# Patient Record
Sex: MALE | Race: WHITE | NOT HISPANIC OR LATINO | Employment: FULL TIME | URBAN - METROPOLITAN AREA
[De-identification: names, ages, dates, MRNs, and addresses within clinical notes are randomized per-mention and may not be internally consistent; named-entity substitution may affect disease eponyms.]

---

## 2017-02-01 ENCOUNTER — TELEPHONE (OUTPATIENT)
Dept: ENDOCRINOLOGY | Facility: CLINIC | Age: 52
End: 2017-02-01

## 2017-02-01 NOTE — TELEPHONE ENCOUNTER
----- Message from Jewels Cavanaugh LPN sent at 2/1/2017  2:42 PM CST -----  Contact:  call //[phone]// wife// abeba   Pt insurance will not pay for name brand synthroid.Pt needs a letter that states he needs name brand only. Please advise   ----- Message -----     From: Vera Mir     Sent: 2/1/2017   1:41 PM       To: George Celaya Staff      Placed a call  To  The pod //   Pt  Needs a  Letter for  Crestor 5  Mg  ,   Because ins //   Will  Be out    By   Next  Week // please call

## 2017-02-03 ENCOUNTER — TELEPHONE (OUTPATIENT)
Dept: ENDOCRINOLOGY | Facility: CLINIC | Age: 52
End: 2017-02-03

## 2017-02-22 ENCOUNTER — OFFICE VISIT (OUTPATIENT)
Dept: ENDOCRINOLOGY | Facility: CLINIC | Age: 52
End: 2017-02-22
Payer: COMMERCIAL

## 2017-02-22 VITALS
SYSTOLIC BLOOD PRESSURE: 118 MMHG | BODY MASS INDEX: 36.43 KG/M2 | HEIGHT: 72 IN | HEART RATE: 82 BPM | WEIGHT: 268.94 LBS | DIASTOLIC BLOOD PRESSURE: 78 MMHG

## 2017-02-22 DIAGNOSIS — E78.5 HYPERLIPIDEMIA, UNSPECIFIED HYPERLIPIDEMIA TYPE: ICD-10-CM

## 2017-02-22 DIAGNOSIS — E04.2 MULTINODULAR GOITER: Primary | ICD-10-CM

## 2017-02-22 DIAGNOSIS — E89.0 POSTOPERATIVE HYPOTHYROIDISM: ICD-10-CM

## 2017-02-22 PROCEDURE — 99214 OFFICE O/P EST MOD 30 MIN: CPT | Mod: S$GLB,,, | Performed by: INTERNAL MEDICINE

## 2017-02-22 PROCEDURE — 1160F RVW MEDS BY RX/DR IN RCRD: CPT | Mod: S$GLB,,, | Performed by: INTERNAL MEDICINE

## 2017-02-22 PROCEDURE — 99999 PR PBB SHADOW E&M-EST. PATIENT-LVL III: CPT | Mod: PBBFAC,,, | Performed by: INTERNAL MEDICINE

## 2017-02-22 RX ORDER — NAFTIFINE HYDROCHLORIDE 20 MG/G
CREAM TOPICAL
Refills: 2 | COMMUNITY
Start: 2017-01-16 | End: 2019-03-15

## 2017-02-22 NOTE — MR AVS SNAPSHOT
[facility]  [address] [facility] [address]  [place] [address]  Phone: [phone]  Fax: [phone]                  Williams Kumari   2017 3:30 PM   Office Visit    Description:  Male : 1965   Provider:  Yomi Vazquez MD   Department:  [place] [facility]           Reason for Visit     Hypothyroidism           Diagnoses this Visit        Comments    Multinodular goiter    -  Primary     Postoperative hypothyroidism         Hyperlipidemia, unspecified hyperlipidemia type                To Do List           Goals (5 Years of Data)     None      [facility]     [facility] Care [facility] -  Assistance  Registered nurses in the [facility] provide clinical advisement, health education, appointment booking, and other advisory services.  Call for this free service at [phone].             Medications           Message regarding Medications     Verify the changes and/or additions to your medication regime listed below are the same as discussed with your clinician today.  If any of these changes or additions are incorrect, please notify your healthcare provider.        STOP taking these medications     pantoprazole (PROTONIX) 40 MG tablet Take 40 mg by mouth once daily.           Verify that the below list of medications is an accurate representation of the medications you are currently taking.  If none reported, the list may be blank. If incorrect, please contact your healthcare provider. Carry this list with you in case of emergency.           Current Medications     aspirin (ECOTRIN) 81 MG EC tablet Take 81 mg by mouth once daily.    CRESTOR 5 mg tablet Take 5 mg by mouth every evening.     levothyroxine (SYNTHROID) 25 MCG tablet Take 1 tablet (25 mcg total) by mouth once daily.    naftifine 2 % Crea NEIL AA BID           Clinical Reference Information           Your Vitals Were     BP Pulse Height Weight BMI    118/78 (BP Method: Manual) 82 6' (1.829  m) 122 kg (268 lb 15.4 oz) 36.48 kg/m2      Blood Pressure          Most Recent Value    BP  118/78      Allergies as of 2/22/2017     No Known Allergies      Immunizations Administered on Date of Encounter - 2/22/2017     None      Orders Placed During Today's Visit     Future Labs/Procedures Expected by Expires    TSH  2/22/2017 4/23/2018    US Soft Tissue Head Neck Thyroid  2/22/2017 2/22/2018      Language Assistance Services     ATTENTION: Language assistance services are available, free of charge. Please call [phone].      ATENCIÓN: Si habla español, tiene a campbell disposición servicios gratuitos de asistencia lingüística. Llame al [phone].     CHÚ Ý: N?u b?n nói Ti?ng Vi?t, có các d?ch v? h? tr? ngôn ng? mi?n phí dành cho b?n. G?i s? [phone].         [facility] complies with applicable Federal civil rights laws and does not discriminate on the basis of race, color, national origin, age, disability, or sex.

## 2017-02-22 NOTE — PROGRESS NOTES
Subjective:      Patient ID: Carmela Duff is a 51 y.o. male.    Chief Complaint:  Hypothyroidism      History of Present Illness   [facility] he had right hemithyroidectomy due to MNG and no thyroid cancer per Path report.   No FH of thyroid cancer except second cousin    Comes for f/u today       Denies weight gain  denies temperature changes      Denies dysphagia/dyspnea/dysphonia               Review of Systems   Constitutional: Positive for fatigue and unexpected weight change.   Eyes: Negative for visual disturbance.   Respiratory: Negative for shortness of breath.    Cardiovascular: Negative for chest pain.   Gastrointestinal: Negative for abdominal pain, constipation and diarrhea.   Musculoskeletal: Negative for myalgias.   Skin: Negative for wound.   Neurological: Negative for headaches.   Hematological: Does not bruise/bleed easily.   Psychiatric/Behavioral: Negative for sleep disturbance.       Objective:   Physical Exam   Constitutional: No distress.   Neck: No tracheal deviation present. No thyromegaly present.   Skin: He is not diaphoretic.   Vitals reviewed.      Lab Review:     Results for CARMELA DUFF (MRN [id number]) as of 3/16/2016 13:21   Ref. Range 12/10/2015 07:52   Sodium Latest Ref Range: 136-145 mmol/L 141   Potassium Latest Ref Range: 3.5-5.1 mmol/L 4.4   Chloride Latest Ref Range:  mmol/L 103   CO2 Latest Ref Range: 23-29 mmol/L 28   Anion Gap Latest Ref Range: 8-16 mmol/L 10   BUN, Bld Latest Ref Range: 9-21 mg/dL 13   Creatinine Latest Ref Range: 0.50-1.40 mg/dL 0.92   eGFR if non African American Latest Ref Range: >60 mL/min/1.73 m^2 >60   eGFR if  Latest Ref Range: >60 mL/min/1.73 m^2 >60   Glucose Latest Ref Range:  mg/dL 92   Calcium Latest Ref Range: 8.7-10.5 mg/dL 9.0   Alkaline Phosphatase Latest Ref Range:  U/L 75   Total Protein Latest Ref Range: 6.0-8.4 g/dL 6.1   Albumin Latest Ref Range: 3.5-5.2 g/dL 3.8   Total Bilirubin  Latest Ref Range: 0.1-1.0 mg/dL 0.4   AST ([place] Latest Ref Range: 17-59 U/L 25   ALT Latest Ref Range: 10-44 U/L 41   Triglycerides Latest Ref Range:  mg/dL 58   Cholesterol Latest Ref Range: 120-199 mg/dL 148   HDL Latest Ref Range: 40-75 mg/dL 39 (L)   LDL Cholesterol Latest Ref Range: 63.0-159.0 mg/dL 97.4   Total Cholesterol/HDL Ratio Latest Ref Range: 2.0-5.0  3.8   TSH Latest Ref Range: 0.400-4.000 uIU/mL 2.840   Free T4 Latest Ref Range: 0.78-2.19 ng/dL 0.69 (L)   Results for CARMELA DUFF (MRN [id number]) as of 2/22/2017 16:19   Ref. Range 12/30/2016 07:21   Cholesterol Latest Ref Range: 120 - 199 mg/dL 144   HDL Latest Ref Range: 40 - 75 mg/dL 35 (L)   LDL Cholesterol Latest Ref Range: 63.0 - 159.0 mg/dL 90.0   Total Cholesterol/HDL Ratio Latest Ref Range: 2.0 - 5.0  4.1   Triglycerides Latest Ref Range: 30 - 150 mg/dL 95   TSH Latest Ref Range: 0.400 - 4.000 uIU/mL 2.380     Assessment:     Plan:     # hypothyroidism s/p jada thyroidectomy     - continue SYnthroid 25 mcg daily   Can not take generic       # MNG   - repeat US in 1 year    # hyperlipidemia with FH of early CAD  Follows with PCP    Follow up:  Blood work before next visit   - repeat US in 1 year  - RTC with TSH in 12 months.

## 2017-03-20 RX ORDER — LEVOTHYROXINE SODIUM 25 UG/1
TABLET ORAL
Qty: 30 TABLET | Refills: 0 | Status: SHIPPED | OUTPATIENT
Start: 2017-03-20 | End: 2017-03-21 | Stop reason: SDUPTHER

## 2017-03-21 RX ORDER — LEVOTHYROXINE SODIUM 25 UG/1
TABLET ORAL
Qty: 30 TABLET | Refills: 6 | Status: SHIPPED | OUTPATIENT
Start: 2017-03-21 | End: 2017-11-16 | Stop reason: SDUPTHER

## 2017-03-21 NOTE — TELEPHONE ENCOUNTER
----- Message from Anne Billingsley sent at 3/21/2017  8:44 AM CDT -----  Contact: [place]  Requesting a call back in regards to Rx refills for SYNTHROID 25 mcg tablet.    Stated that the patient requested BRAND only with refills.    Please send Rx [facility] [phone] (Phone)  [phone] (Fax)    Please call [place] at [phone].

## 2017-11-08 ENCOUNTER — TELEPHONE (OUTPATIENT)
Dept: ENDOCRINOLOGY | Facility: CLINIC | Age: 52
End: 2017-11-08

## 2017-11-08 DIAGNOSIS — E03.9 HYPOTHYROIDISM, UNSPECIFIED TYPE: Primary | ICD-10-CM

## 2017-11-08 NOTE — TELEPHONE ENCOUNTER
----- Message from Lima Petersen sent at 11/8/2017  9:49 AM CST -----  Wife/[place]ali is calling concerning patient having hot flashes and dizzy. They do not know what is going on. Wife called on 11/6/17 and still has not received a reply. They are worried about patient's symptoms. Please call back at [phone] to advise.

## 2017-11-08 NOTE — TELEPHONE ENCOUNTER
I spoke to the patient and he would like to have labs drawn now. He is not scheduled to see you until March 2018 with a TSH and an US. Do you want to check any other labs besides TFT's, ?? Testosterone, CBC

## 2017-11-08 NOTE — TELEPHONE ENCOUNTER
The patient will have a TSH drawn tomorrow, and consult his PCP for the hot flashes and dizziness.

## 2017-11-17 RX ORDER — LEVOTHYROXINE SODIUM 25 UG/1
TABLET ORAL
Qty: 30 TABLET | Refills: 6 | Status: SHIPPED | OUTPATIENT
Start: 2017-11-17 | End: 2018-06-13 | Stop reason: SDUPTHER

## 2018-01-22 PROBLEM — E04.1 LEFT THYROID NODULE: Status: ACTIVE | Noted: 2018-01-22

## 2018-03-05 ENCOUNTER — TELEPHONE (OUTPATIENT)
Dept: ENDOCRINOLOGY | Facility: CLINIC | Age: 53
End: 2018-03-05

## 2018-03-05 NOTE — TELEPHONE ENCOUNTER
Dr. Vazquez, pt's wife is calling to see if US needs to be repeated prior to appt.  PCP ordered US in Dec 2017, pt then had FNA by ENT (unsatisfactory).  Should he keep US appt for next week?

## 2018-03-15 ENCOUNTER — LAB VISIT (OUTPATIENT)
Dept: LAB | Facility: HOSPITAL | Age: 53
End: 2018-03-15
Attending: INTERNAL MEDICINE
Payer: COMMERCIAL

## 2018-03-15 DIAGNOSIS — E89.0 POSTOPERATIVE HYPOTHYROIDISM: ICD-10-CM

## 2018-03-15 DIAGNOSIS — E04.2 MULTINODULAR GOITER: ICD-10-CM

## 2018-03-15 LAB — TSH SERPL DL<=0.005 MIU/L-ACNC: 2.54 UIU/ML

## 2018-03-15 PROCEDURE — 84443 ASSAY THYROID STIM HORMONE: CPT

## 2018-03-15 PROCEDURE — 36415 COLL VENOUS BLD VENIPUNCTURE: CPT | Mod: PO

## 2018-03-19 ENCOUNTER — OFFICE VISIT (OUTPATIENT)
Dept: ENDOCRINOLOGY | Facility: CLINIC | Age: 53
End: 2018-03-19
Payer: COMMERCIAL

## 2018-03-19 VITALS
DIASTOLIC BLOOD PRESSURE: 86 MMHG | BODY MASS INDEX: 35.89 KG/M2 | WEIGHT: 265 LBS | SYSTOLIC BLOOD PRESSURE: 120 MMHG | HEART RATE: 54 BPM | HEIGHT: 72 IN

## 2018-03-19 DIAGNOSIS — E89.0 POSTOPERATIVE HYPOTHYROIDISM: Primary | ICD-10-CM

## 2018-03-19 DIAGNOSIS — E04.1 LEFT THYROID NODULE: ICD-10-CM

## 2018-03-19 PROCEDURE — 99214 OFFICE O/P EST MOD 30 MIN: CPT | Mod: S$GLB,,, | Performed by: INTERNAL MEDICINE

## 2018-03-19 PROCEDURE — 99999 PR PBB SHADOW E&M-EST. PATIENT-LVL III: CPT | Mod: PBBFAC,,, | Performed by: INTERNAL MEDICINE

## 2018-03-19 NOTE — PROGRESS NOTES
Subjective:      Patient ID: Carmela Duff is a 52 y.o. male.    Chief Complaint:  Hypothyroidism      History of Present Illness   [facility] due to MNG and no thyroid cancer per Path report.   No FH of thyroid cancer except second cousin    Comes for f/u today       Denies weight gain  denies temperature changes      Denies dysphagia/dyspnea/dysphonia               Review of Systems   Constitutional: Positive for fatigue and unexpected weight change.   Eyes: Negative for visual disturbance.   Respiratory: Negative for shortness of breath.    Cardiovascular: Negative for chest pain.   Gastrointestinal: Negative for abdominal pain, constipation and diarrhea.   Musculoskeletal: Negative for myalgias.   Skin: Negative for wound.   Neurological: Negative for headaches.   Hematological: Does not bruise/bleed easily.   Psychiatric/Behavioral: Negative for sleep disturbance.       Objective:   Physical Exam   Constitutional: No distress.   Neck: No tracheal deviation present. No thyromegaly present.   Skin: He is not diaphoretic.   Vitals reviewed.      Lab Review:     Results for CARMELA DUFF (MRN [id number]) as of 3/16/2016 13:21   Ref. Range 12/10/2015 07:52   Sodium Latest Ref Range: 136-145 mmol/L 141   Potassium Latest Ref Range: 3.5-5.1 mmol/L 4.4   Chloride Latest Ref Range:  mmol/L 103   CO2 Latest Ref Range: 23-29 mmol/L 28   Anion Gap Latest Ref Range: 8-16 mmol/L 10   BUN, Bld Latest Ref Range: 9-21 mg/dL 13   Creatinine Latest Ref Range: 0.50-1.40 mg/dL 0.92   eGFR if non African American Latest Ref Range: >60 mL/min/1.73 m^2 >60   eGFR if  Latest Ref Range: >60 mL/min/1.73 m^2 >60   Glucose Latest Ref Range:  mg/dL 92   Calcium Latest Ref Range: 8.7-10.5 mg/dL 9.0   Alkaline Phosphatase Latest Ref Range:  U/L 75   Total Protein Latest Ref Range: 6.0-8.4 g/dL 6.1   Albumin Latest Ref Range: 3.5-5.2 g/dL 3.8   Total Bilirubin  Latest Ref Range: 0.1-1.0 mg/dL 0.4   AST ([place]) Latest Ref Range: 17-59 U/L 25   ALT Latest Ref Range: 10-44 U/L 41   Triglycerides Latest Ref Range:  mg/dL 58   Cholesterol Latest Ref Range: 120-199 mg/dL 148   HDL Latest Ref Range: 40-75 mg/dL 39 (L)   LDL Cholesterol Latest Ref Range: 63.0-159.0 mg/dL 97.4   Total Cholesterol/HDL Ratio Latest Ref Range: 2.0-5.0  3.8   TSH Latest Ref Range: 0.400-4.000 uIU/mL 2.840   Free T4 Latest Ref Range: 0.78-2.19 ng/dL 0.69 (L)   Results for CARMELA DUFF (MRN [id number]) as of 2/22/2017 16:19   Ref. Range 12/30/2016 07:21   Cholesterol Latest Ref Range: 120 - 199 mg/dL 144   HDL Latest Ref Range: 40 - 75 mg/dL 35 (L)   LDL Cholesterol Latest Ref Range: 63.0 - 159.0 mg/dL 90.0   Total Cholesterol/HDL Ratio Latest Ref Range: 2.0 - 5.0  4.1   Triglycerides Latest Ref Range: 30 - 150 mg/dL 95   TSH Latest Ref Range: 0.400 - 4.000 uIU/mL 2.380     Assessment:     Plan:     # hypothyroidism s/p jada thyroidectomy     - continue SYnthroid 25 mcg daily   Can not take generic       # MNG   - repeat US in 1 year    # hyperlipidemia with FH of early CAD  Follows with PCP    Follow up:  TSH and US thyroid in 6 months   RTC in 6 months

## 2018-06-14 RX ORDER — LEVOTHYROXINE SODIUM 25 UG/1
TABLET ORAL
Qty: 30 TABLET | Refills: 6 | Status: SHIPPED | OUTPATIENT
Start: 2018-06-14 | End: 2018-12-15 | Stop reason: SDUPTHER

## 2018-09-17 ENCOUNTER — HOSPITAL ENCOUNTER (OUTPATIENT)
Dept: RADIOLOGY | Facility: HOSPITAL | Age: 53
Discharge: HOME | End: 2018-09-17
Attending: INTERNAL MEDICINE
Payer: COMMERCIAL

## 2018-09-17 DIAGNOSIS — E04.1 LEFT THYROID NODULE: ICD-10-CM

## 2018-09-17 DIAGNOSIS — E89.0 POSTOPERATIVE HYPOTHYROIDISM: ICD-10-CM

## 2018-09-17 PROCEDURE — 76536 US EXAM OF HEAD AND NECK: CPT | Mod: TC,PO

## 2018-09-17 PROCEDURE — 76536 US EXAM OF HEAD AND NECK: CPT | Mod: 26,,, | Performed by: RADIOLOGY

## 2018-09-24 ENCOUNTER — OFFICE VISIT (OUTPATIENT)
Dept: ENDOCRINOLOGY | Facility: CLINIC | Age: 53
End: 2018-09-24
Payer: COMMERCIAL

## 2018-09-24 VITALS
SYSTOLIC BLOOD PRESSURE: 132 MMHG | WEIGHT: 272.38 LBS | BODY MASS INDEX: 36.89 KG/M2 | HEART RATE: 48 BPM | HEIGHT: 72 IN | DIASTOLIC BLOOD PRESSURE: 82 MMHG

## 2018-09-24 DIAGNOSIS — E03.9 HYPOTHYROIDISM, UNSPECIFIED TYPE: Primary | ICD-10-CM

## 2018-09-24 DIAGNOSIS — E04.2 MULTIPLE THYROID NODULES: ICD-10-CM

## 2018-09-24 PROCEDURE — 3008F BODY MASS INDEX DOCD: CPT | Mod: CPTII,S$GLB,, | Performed by: INTERNAL MEDICINE

## 2018-09-24 PROCEDURE — 99999 PR PBB SHADOW E&M-EST. PATIENT-LVL III: CPT | Mod: PBBFAC,,, | Performed by: INTERNAL MEDICINE

## 2018-09-24 PROCEDURE — 99214 OFFICE O/P EST MOD 30 MIN: CPT | Mod: S$GLB,,, | Performed by: INTERNAL MEDICINE

## 2018-09-24 NOTE — PROGRESS NOTES
CHIEF COMPLAINT: Hypothyroidism  52 year old being seen as a new patient to me. Was seeing Dr. Vazquez. 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 Had a left unsatisfactory FNA 1/22/18. Synthroid 25 mcg. Started prior to thyroid surgery. No palpitations. No tremors. No difficulty swallowing.       PAST MEDICAL HISTORY/PAST SURGICAL HISTORY:  Reviewed in [facility]    SOCIAL HISTORY: + chewing tobacco. No alcohol.    FAMILY HISTORY:  2nd cousin with thyroid cancer. No DM    MEDICATIONS/ALLERGIES: The patient's MedCard has been updated and reviewed.      ROS:   Constitutional: No recent significant weight change  Eyes: No recent visual changes  ENT: No dysphagia  Cardiovascular: Denies current anginal symptoms  Respiratory: Denies current respiratory difficulty  Gastrointestinal: Denies recent bowel disturbances  GenitoUrinary - No dysuria  Skin: No new skin rash  Neurologic: No focal neurologic complaints  Remainder ROS negative        PE:    GENERAL: Well developed, well nourished.  PSYCH:  appropriate mood and affect  EYES:  PERRL, EOM intact.  ENT: Nares patent, oropharynx clear, mucosa pink,   NECK: Supple, trachea midline, No palpable thyroid nodule.   CHEST: Resp even and unlabored, CTA bilateral.  CARDIAC: RRR, S1, S2 heard, no murmurs, rubs, S3, or S4    LABS   Results for CARMELA DUFF (MRN [id number]) as of 9/24/2018 08:39   Ref. Range 9/17/2018 07:34   TSH Latest Ref Range: 0.400 - 4.000 uIU/mL 2.342     US SOFT TISSUE HEAD NECK THYROID    CLINICAL HISTORY:  Postprocedural hypothyroidism    TECHNIQUE:  Ultrasound of the thyroid and cervical lymph nodes was performed.    COMPARISON:  Thyroid ultrasound dated 12/06/2017    FINDINGS:  The right lobe of the thyroid gland is surgically absent.  The left lobe measures 4.0 x 1.5 x 1.6 cm with an estimated volume of 5.0 mL.  The thyroid isthmus measures 3 mm in thickness.    The patient underwent ultrasound-guided biopsy of a left thyroid nodule on  01/22/2018.    There is a 7 x 6 x 5 mm slightly heterogeneous nodule in the lower pole of the left lobe.  Previously, a 9 x 12 x 7 mm nodule was present at this site and was biopsied.  There is no new nodule.  There is no detrimental change in the appearance of the left lobe.    There is a normal right submandibular node measuring 1.4 x 0.7 x 0.5 cm.  In the left neck, there are sonographically normal level 2 and level 6 lymph nodes.      Impression       1. There is a slightly heterogeneous solid nodule in the lower pole of the left lobe which is slightly smaller compared to prior ultrasound dated 12/06/2017.  There is no detrimental change in the appearance of the thyroid gland compared to the prior study.  The right lobe is surgically absent.  2. There are normal bilateral neck lymph nodes.         ASSESSMENT/PLAN:  1. Thyroid Nodules- Has had a right lobectomy. Had an unsatisfactory FNA on the left 1/2018. US shows some slight reduction in nodule sie. At this point can repeat in a year    2. Hypothyroidism- TFT WNL. Symptomatically doing well.       FOLLOWUP  F/U 1 year TSH, Thyroid US

## 2018-12-16 RX ORDER — LEVOTHYROXINE SODIUM 25 UG/1
TABLET ORAL
Qty: 30 TABLET | Refills: 3 | Status: SHIPPED | OUTPATIENT
Start: 2018-12-16 | End: 2019-05-13 | Stop reason: SDUPTHER

## 2018-12-31 ENCOUNTER — TELEPHONE (OUTPATIENT)
Dept: INFECTIOUS DISEASES | Facility: CLINIC | Age: 53
End: 2018-12-31

## 2018-12-31 NOTE — TELEPHONE ENCOUNTER
----- Message from Gia Pat sent at 12/31/2018  8:45 AM CST -----  Contact: wife [place]   Wife needs to make a new patient appt foe patient today     Please call back [phone]

## 2019-05-13 RX ORDER — LEVOTHYROXINE SODIUM 25 UG/1
TABLET ORAL
Qty: 30 TABLET | Refills: 3 | Status: SHIPPED | OUTPATIENT
Start: 2019-05-13 | End: 2019-09-16 | Stop reason: SDUPTHER

## 2019-06-14 NOTE — TELEPHONE ENCOUNTER
Agree.   
Dr. Vazquez, I spoke w/ pt, states the generic did not work in the past, therefore, previous doctor has kept him on brand name. I spoke w/ the insurance and if we can fax a note Monday stating he tried and failed generic, they should cover it.   
Dr. Vazquez, the pt was only seen by you once and I do not see any documentation as to why he needs brand name.  Please advise before I call the insurance.    
Sure. Remind me and we will do that  
well developed

## 2019-09-16 DIAGNOSIS — E03.9 HYPOTHYROIDISM, UNSPECIFIED TYPE: Primary | ICD-10-CM

## 2019-09-17 RX ORDER — LEVOTHYROXINE SODIUM 25 UG/1
TABLET ORAL
Qty: 30 TABLET | Refills: 1 | Status: SHIPPED | OUTPATIENT
Start: 2019-09-17 | End: 2019-10-18 | Stop reason: SDUPTHER

## 2019-09-24 ENCOUNTER — HOSPITAL ENCOUNTER (OUTPATIENT)
Dept: RADIOLOGY | Facility: HOSPITAL | Age: 54
Discharge: HOME | End: 2019-09-24
Attending: INTERNAL MEDICINE
Payer: COMMERCIAL

## 2019-09-24 DIAGNOSIS — E03.9 HYPOTHYROIDISM, UNSPECIFIED TYPE: ICD-10-CM

## 2019-09-24 DIAGNOSIS — E04.2 MULTIPLE THYROID NODULES: ICD-10-CM

## 2019-09-24 PROCEDURE — 76536 US EXAM OF HEAD AND NECK: CPT | Mod: TC,PO

## 2019-09-24 PROCEDURE — 76536 US EXAM OF HEAD AND NECK: CPT | Mod: 26,,, | Performed by: RADIOLOGY

## 2019-09-24 PROCEDURE — 76536 US SOFT TISSUE HEAD NECK THYROID: ICD-10-PCS | Mod: 26,,, | Performed by: RADIOLOGY

## 2019-10-18 DIAGNOSIS — E03.9 HYPOTHYROIDISM, UNSPECIFIED TYPE: ICD-10-CM

## 2019-10-18 RX ORDER — LEVOTHYROXINE SODIUM 25 UG/1
TABLET ORAL
Qty: 30 TABLET | Refills: 1 | Status: SHIPPED | OUTPATIENT
Start: 2019-10-18 | End: 2019-12-18 | Stop reason: SDUPTHER

## 2019-10-18 RX ORDER — LEVOTHYROXINE SODIUM 25 UG/1
TABLET ORAL
Qty: 30 TABLET | Refills: 1 | Status: CANCELLED | OUTPATIENT
Start: 2019-10-18

## 2019-10-21 ENCOUNTER — TELEPHONE (OUTPATIENT)
Dept: ENDOCRINOLOGY | Facility: CLINIC | Age: 54
End: 2019-10-21

## 2019-10-21 NOTE — TELEPHONE ENCOUNTER
Spoke to pt and adv Dr Michel is booked through April 2020 and to call back on Nov 1st and schedule for May 2020, voiced understanding. Also added to waitlist.

## 2019-10-21 NOTE — TELEPHONE ENCOUNTER
----- Message from Mell Barrera sent at 10/21/2019 12:52 PM CDT -----  Contact: [phone]  Patient is requesting a call back from the nurse stated he unable to keep 10/24/19 appointment, requesting to be seen asap.  Unable to schedule any future dates in Epic.  Please call the patient upon request at phone number [phone].

## 2020-01-14 ENCOUNTER — PATIENT MESSAGE (OUTPATIENT)
Dept: ENDOCRINOLOGY | Facility: CLINIC | Age: 55
End: 2020-01-14

## 2020-01-14 DIAGNOSIS — E03.9 HYPOTHYROIDISM, UNSPECIFIED TYPE: Primary | ICD-10-CM

## 2020-01-14 NOTE — TELEPHONE ENCOUNTER
Renu w/ Dioni enciso/ [facility] (ID #[id number]), PA initiated for brand name Synthroid.  Will take up to 24 hrs for determination, ref #[id number]).

## 2020-01-15 ENCOUNTER — PATIENT MESSAGE (OUTPATIENT)
Dept: ENDOCRINOLOGY | Facility: CLINIC | Age: 55
End: 2020-01-15

## 2020-01-15 DIAGNOSIS — E03.9 HYPOTHYROIDISM, UNSPECIFIED TYPE: Primary | ICD-10-CM

## 2020-01-15 NOTE — TELEPHONE ENCOUNTER
Pt is seeing you next month.  He had an US done in Sept 2019.  Does he need another one prior to visit?

## 2020-01-16 ENCOUNTER — PATIENT MESSAGE (OUTPATIENT)
Dept: ENDOCRINOLOGY | Facility: CLINIC | Age: 55
End: 2020-01-16

## 2020-01-31 ENCOUNTER — LAB VISIT (OUTPATIENT)
Dept: LAB | Facility: HOSPITAL | Age: 55
End: 2020-01-31
Attending: INTERNAL MEDICINE
Payer: COMMERCIAL

## 2020-01-31 DIAGNOSIS — E03.9 HYPOTHYROIDISM, UNSPECIFIED TYPE: ICD-10-CM

## 2020-01-31 LAB — TSH SERPL DL<=0.005 MIU/L-ACNC: 1.41 UIU/ML (ref 0.4–4)

## 2020-01-31 PROCEDURE — 36415 COLL VENOUS BLD VENIPUNCTURE: CPT | Mod: PO

## 2020-01-31 PROCEDURE — 84443 ASSAY THYROID STIM HORMONE: CPT

## 2020-02-05 ENCOUNTER — OFFICE VISIT (OUTPATIENT)
Dept: ENDOCRINOLOGY | Facility: CLINIC | Age: 55
End: 2020-02-05
Payer: COMMERCIAL

## 2020-02-05 ENCOUNTER — LAB VISIT (OUTPATIENT)
Dept: LAB | Facility: HOSPITAL | Age: 55
End: 2020-02-05
Attending: INTERNAL MEDICINE
Payer: COMMERCIAL

## 2020-02-05 VITALS
HEIGHT: 72 IN | BODY MASS INDEX: 36.16 KG/M2 | SYSTOLIC BLOOD PRESSURE: 118 MMHG | DIASTOLIC BLOOD PRESSURE: 78 MMHG | HEART RATE: 57 BPM | OXYGEN SATURATION: 97 % | WEIGHT: 267 LBS

## 2020-02-05 DIAGNOSIS — E03.9 HYPOTHYROIDISM, UNSPECIFIED TYPE: ICD-10-CM

## 2020-02-05 DIAGNOSIS — E04.2 MULTIPLE THYROID NODULES: Primary | ICD-10-CM

## 2020-02-05 DIAGNOSIS — E04.2 MULTIPLE THYROID NODULES: ICD-10-CM

## 2020-02-05 PROCEDURE — 36415 COLL VENOUS BLD VENIPUNCTURE: CPT | Mod: PO

## 2020-02-05 PROCEDURE — 99999 PR PBB SHADOW E&M-EST. PATIENT-LVL III: ICD-10-PCS | Mod: PBBFAC,,, | Performed by: INTERNAL MEDICINE

## 2020-02-05 PROCEDURE — 99213 PR OFFICE/OUTPT VISIT, EST, LEVL III, 20-29 MIN: ICD-10-PCS | Mod: S$GLB,,, | Performed by: INTERNAL MEDICINE

## 2020-02-05 PROCEDURE — 3008F PR BODY MASS INDEX (BMI) DOCUMENTED: ICD-10-PCS | Mod: CPTII,S$GLB,, | Performed by: INTERNAL MEDICINE

## 2020-02-05 PROCEDURE — 84305 ASSAY OF SOMATOMEDIN: CPT

## 2020-02-05 PROCEDURE — 3008F BODY MASS INDEX DOCD: CPT | Mod: CPTII,S$GLB,, | Performed by: INTERNAL MEDICINE

## 2020-02-05 PROCEDURE — 99999 PR PBB SHADOW E&M-EST. PATIENT-LVL III: CPT | Mod: PBBFAC,,, | Performed by: INTERNAL MEDICINE

## 2020-02-05 PROCEDURE — 99213 OFFICE O/P EST LOW 20 MIN: CPT | Mod: S$GLB,,, | Performed by: INTERNAL MEDICINE

## 2020-02-05 RX ORDER — LEVOTHYROXINE SODIUM 25 UG/1
TABLET ORAL
Qty: 90 TABLET | Refills: 3 | Status: SHIPPED | OUTPATIENT
Start: 2020-02-05 | End: 2021-09-20 | Stop reason: DRUGHIGH

## 2020-02-05 NOTE — PROGRESS NOTES
CHIEF COMPLAINT: Hypothyroidism  54 year old being seen as a f/u. Was seeing Dr. Vazquez. 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 Had a left unsatisfactory FNA 1/22/18. Synthroid 25 mcg (On brand name). Started prior to thyroid surgery. Overall feeling well. No Palpitations. No tremors. No change in voice.         PAST MEDICAL HISTORY/PAST SURGICAL HISTORY:  Reviewed in [facility]    SOCIAL HISTORY: + chewing tobacco. No alcohol.    FAMILY HISTORY:  2nd cousin with thyroid cancer. No DM    MEDICATIONS/ALLERGIES: The patient's MedCard has been updated and reviewed.      ROS:   Constitutional: No recent significant weight change  Eyes: No recent visual changes  ENT: No dysphagia  Cardiovascular: Denies current anginal symptoms  Respiratory: Denies current respiratory difficulty  Gastrointestinal: Denies recent bowel disturbances  GenitoUrinary - No dysuria  Skin: No new skin rash  Neurologic: No focal neurologic complaints  Remainder ROS negative        PE:    GENERAL: Well developed, well nourished.  NECK: Supple, trachea midline, No palpable thyroid nodule.   CHEST: Resp even and unlabored, CTA bilateral.  CARDIAC: RRR, S1, S2 heard, no murmurs, rubs, S3, or S4    Results for CARMELA DUFF (MRN [id number]) as of 2/5/2020 08:13   Ref. Range 1/31/2020 12:46   TSH Latest Ref Range: 0.400 - 4.000 uIU/mL 1.408       US SOFT TISSUE HEAD NECK THYROID    CLINICAL HISTORY:  .  Hypothyroidism, unspecified    TECHNIQUE:  Ultrasound of the thyroid and cervical lymph nodes was performed.    COMPARISON:  09/17/2018    FINDINGS:  Patient is status post right thyroid lobectomy.  No abnormal soft tissue is visualized within the right thyroidectomy bed.  Left lobe of the thyroid measures 4.1 x 1.5 x 1.8 cm with an estimated volume of 5.8 mL.  Isthmus measures 0.3 cm in thickness.  Total thyroid volume measures 5.8 mL.    Slightly heterogeneous hypoechoic nodule in the lower pole of the left thyroid lobe, measuring 0.6  x 0.5 x 0.7 cm, appears unchanged.  No definite new thyroid nodule.    Normal thyroid perfusion.    No pathologic cervical lymph nodes.      Impression       1. Status post right thyroid lobectomy.  2. Stable left thyroid nodule.  No new thyroid nodule demonstrated.         ASSESSMENT/PLAN:  1. Thyroid Nodules- Has had a right lobectomy. Had an unsatisfactory FNA on the left 1/2018.n US shows no change in nodule size. No obstructive symptoms.     2. Hypothyroidism- TFT WNL. Symptomatically doing well. On brand synthroid.       FOLLOWUP  IGF-1- Today  F/U 1 year TSH, Thyroid US

## 2020-02-07 ENCOUNTER — PATIENT MESSAGE (OUTPATIENT)
Dept: ENDOCRINOLOGY | Facility: CLINIC | Age: 55
End: 2020-02-07

## 2020-02-10 ENCOUNTER — PATIENT MESSAGE (OUTPATIENT)
Dept: ENDOCRINOLOGY | Facility: CLINIC | Age: 55
End: 2020-02-10

## 2020-02-10 ENCOUNTER — TELEPHONE (OUTPATIENT)
Dept: ENDOCRINOLOGY | Facility: CLINIC | Age: 55
End: 2020-02-10

## 2020-02-10 LAB
IGF-I SERPL-MCNC: 164 NG/ML (ref 37–245)
IGF-I Z-SCORE SERPL: 0.84 SD

## 2020-03-23 RX ORDER — ROSUVASTATIN CALCIUM 5 MG
TABLET ORAL
COMMUNITY
Start: 2020-01-21 | End: 2020-12-30 | Stop reason: DRUGHIGH

## 2020-03-23 RX ORDER — LEVOTHYROXINE SODIUM 25 UG/1
TABLET ORAL
COMMUNITY
Start: 2020-01-21 | End: 2021-02-01 | Stop reason: SDUPTHER

## 2020-03-23 RX ORDER — DOXYCYCLINE 100 MG/1
CAPSULE ORAL
COMMUNITY
Start: 2019-12-18 | End: 2021-09-20

## 2020-08-20 ENCOUNTER — TELEPHONE (OUTPATIENT)
Dept: ENDOCRINOLOGY | Facility: CLINIC | Age: 55
End: 2020-08-20

## 2020-08-20 NOTE — TELEPHONE ENCOUNTER
Pt wife did not want to take appt in [place]. Preferred to wait to call back 9/1/20 for an appt March 2021 with lab and US prior

## 2021-02-01 RX ORDER — LEVOTHYROXINE SODIUM 25 UG/1
25 TABLET ORAL
Qty: 90 TABLET | Refills: 3 | Status: SHIPPED | OUTPATIENT
Start: 2021-02-01 | End: 2021-12-30

## 2021-04-29 ENCOUNTER — PATIENT MESSAGE (OUTPATIENT)
Dept: ENDOCRINOLOGY | Facility: CLINIC | Age: 56
End: 2021-04-29

## 2021-04-29 DIAGNOSIS — E03.9 HYPOTHYROIDISM, UNSPECIFIED TYPE: Primary | ICD-10-CM

## 2021-05-10 ENCOUNTER — PATIENT MESSAGE (OUTPATIENT)
Dept: RESEARCH | Facility: HOSPITAL | Age: 56
End: 2021-05-10

## 2021-12-06 DIAGNOSIS — E04.2 MULTIPLE THYROID NODULES: ICD-10-CM

## 2021-12-06 DIAGNOSIS — E03.9 HYPOTHYROIDISM, UNSPECIFIED TYPE: Primary | ICD-10-CM

## 2021-12-27 ENCOUNTER — HOSPITAL ENCOUNTER (OUTPATIENT)
Dept: RADIOLOGY | Facility: HOSPITAL | Age: 56
Discharge: HOME | End: 2021-12-27
Attending: INTERNAL MEDICINE
Payer: COMMERCIAL

## 2021-12-27 DIAGNOSIS — E04.2 MULTIPLE THYROID NODULES: ICD-10-CM

## 2021-12-27 PROCEDURE — 76536 US EXAM OF HEAD AND NECK: CPT | Mod: 26,,, | Performed by: RADIOLOGY

## 2021-12-27 PROCEDURE — 76536 US EXAM OF HEAD AND NECK: CPT | Mod: TC,PO

## 2021-12-27 PROCEDURE — 76536 US SOFT TISSUE HEAD NECK THYROID: ICD-10-PCS | Mod: 26,,, | Performed by: RADIOLOGY

## 2021-12-29 ENCOUNTER — OFFICE VISIT (OUTPATIENT)
Dept: ENDOCRINOLOGY | Facility: CLINIC | Age: 56
End: 2021-12-29
Payer: COMMERCIAL

## 2021-12-29 ENCOUNTER — PATIENT MESSAGE (OUTPATIENT)
Dept: ENDOCRINOLOGY | Facility: CLINIC | Age: 56
End: 2021-12-29

## 2021-12-29 VITALS
WEIGHT: 273 LBS | HEIGHT: 72 IN | BODY MASS INDEX: 36.98 KG/M2 | DIASTOLIC BLOOD PRESSURE: 80 MMHG | SYSTOLIC BLOOD PRESSURE: 134 MMHG | HEART RATE: 63 BPM | OXYGEN SATURATION: 97 %

## 2021-12-29 DIAGNOSIS — E04.2 MULTIPLE THYROID NODULES: ICD-10-CM

## 2021-12-29 DIAGNOSIS — E03.9 HYPOTHYROIDISM, UNSPECIFIED TYPE: Primary | ICD-10-CM

## 2021-12-29 PROCEDURE — 1159F PR MEDICATION LIST DOCUMENTED IN MEDICAL RECORD: ICD-10-PCS | Mod: CPTII,S$GLB,, | Performed by: INTERNAL MEDICINE

## 2021-12-29 PROCEDURE — 99213 PR OFFICE/OUTPT VISIT, EST, LEVL III, 20-29 MIN: ICD-10-PCS | Mod: S$GLB,,, | Performed by: INTERNAL MEDICINE

## 2021-12-29 PROCEDURE — 1160F RVW MEDS BY RX/DR IN RCRD: CPT | Mod: CPTII,S$GLB,, | Performed by: INTERNAL MEDICINE

## 2021-12-29 PROCEDURE — 99213 OFFICE O/P EST LOW 20 MIN: CPT | Mod: S$GLB,,, | Performed by: INTERNAL MEDICINE

## 2021-12-29 PROCEDURE — 3075F SYST BP GE 130 - 139MM HG: CPT | Mod: CPTII,S$GLB,, | Performed by: INTERNAL MEDICINE

## 2021-12-29 PROCEDURE — 3008F BODY MASS INDEX DOCD: CPT | Mod: CPTII,S$GLB,, | Performed by: INTERNAL MEDICINE

## 2021-12-29 PROCEDURE — 99999 PR PBB SHADOW E&M-EST. PATIENT-LVL III: ICD-10-PCS | Mod: PBBFAC,,, | Performed by: INTERNAL MEDICINE

## 2021-12-29 PROCEDURE — 3075F PR MOST RECENT SYSTOLIC BLOOD PRESS GE 130-139MM HG: ICD-10-PCS | Mod: CPTII,S$GLB,, | Performed by: INTERNAL MEDICINE

## 2021-12-29 PROCEDURE — 1160F PR REVIEW ALL MEDS BY PRESCRIBER/CLIN PHARMACIST DOCUMENTED: ICD-10-PCS | Mod: CPTII,S$GLB,, | Performed by: INTERNAL MEDICINE

## 2021-12-29 PROCEDURE — 3079F PR MOST RECENT DIASTOLIC BLOOD PRESSURE 80-89 MM HG: ICD-10-PCS | Mod: CPTII,S$GLB,, | Performed by: INTERNAL MEDICINE

## 2021-12-29 PROCEDURE — 3079F DIAST BP 80-89 MM HG: CPT | Mod: CPTII,S$GLB,, | Performed by: INTERNAL MEDICINE

## 2021-12-29 PROCEDURE — 3008F PR BODY MASS INDEX (BMI) DOCUMENTED: ICD-10-PCS | Mod: CPTII,S$GLB,, | Performed by: INTERNAL MEDICINE

## 2021-12-29 PROCEDURE — 1159F MED LIST DOCD IN RCRD: CPT | Mod: CPTII,S$GLB,, | Performed by: INTERNAL MEDICINE

## 2021-12-29 PROCEDURE — 99999 PR PBB SHADOW E&M-EST. PATIENT-LVL III: CPT | Mod: PBBFAC,,, | Performed by: INTERNAL MEDICINE

## 2021-12-30 RX ORDER — LEVOTHYROXINE SODIUM 25 UG/1
25 TABLET ORAL
Qty: 30 TABLET | Refills: 11 | Status: SHIPPED | OUTPATIENT
Start: 2021-12-30 | End: 2023-01-03

## 2022-06-14 ENCOUNTER — TELEPHONE (OUTPATIENT)
Dept: ENDOCRINOLOGY | Facility: CLINIC | Age: 57
End: 2022-06-14
Payer: COMMERCIAL

## 2022-06-14 NOTE — TELEPHONE ENCOUNTER
----- Message from Jenifer Childress sent at 6/14/2022 11:36 AM CDT -----  Contact: pt at [phone]  Type:  Sooner Appointment Request    Caller is requesting a sooner appointment.  Caller declined first available appointment listed below.  Caller will not accept being placed on the waitlist and is requesting a message be sent to doctor.    Name of Caller:  pt  When is the first available appointment?  N/A  Symptoms:  f/u  Best Call Back Number:  [phone]  Additional Information:  pt needs an appt in December or January. Please call back and advise.

## 2022-12-27 ENCOUNTER — TELEPHONE (OUTPATIENT)
Dept: ENDOCRINOLOGY | Facility: CLINIC | Age: 57
End: 2022-12-27
Payer: COMMERCIAL

## 2022-12-27 NOTE — TELEPHONE ENCOUNTER
----- Message from Georgia Cueto sent at 12/27/2022  1:45 PM CST -----  Contact: Wife  Pt is needing to schedule his Thyroid US for January right before he sees Dr Michel, but the order in his chart expires on 12/29. Can we please put in a new order for this so pt can schedule this before his appt. Please call them back at [phone], to schedule. Thank you.

## 2023-12-24 DIAGNOSIS — E03.9 HYPOTHYROIDISM, UNSPECIFIED TYPE: ICD-10-CM

## 2023-12-26 ENCOUNTER — PATIENT MESSAGE (OUTPATIENT)
Dept: ENDOCRINOLOGY | Facility: CLINIC | Age: 58
End: 2023-12-26
Payer: COMMERCIAL

## 2023-12-26 DIAGNOSIS — E03.9 HYPOTHYROIDISM, UNSPECIFIED TYPE: ICD-10-CM

## 2023-12-26 RX ORDER — LEVOTHYROXINE SODIUM 25 UG/1
25 TABLET ORAL
Qty: 30 TABLET | Refills: 11 | Status: CANCELLED | OUTPATIENT
Start: 2023-12-26

## 2023-12-26 RX ORDER — LEVOTHYROXINE SODIUM 25 UG/1
TABLET ORAL
Qty: 30 TABLET | Refills: 1 | Status: SHIPPED | OUTPATIENT
Start: 2023-12-26

## 2023-12-26 NOTE — TELEPHONE ENCOUNTER
----- Message from Nisha Campos sent at 12/26/2023  9:04 AM CST -----  Contact: wife [place]  Patients wife I requesting a call back to see what is going on with the patients SYNTHROID 25 mcg tablet.  He is supposed to have one more refill and didier states they couldn't tell them why is was only giving him only two pills. Possible needs a PA after a certain time going into the new year?  Please call back Didier to see what is happening, he doesn't see the doctor until February but needs his meds before then.  Call back patients wife [place] to advise at [phone]  and thanks    [id number] [place] [address] & [address] [address]  Phone: [phone] Fax: [phone]

## 2023-12-26 NOTE — TELEPHONE ENCOUNTER
S/w pharmacy. Approved refills for Synthroid. Pt has upcoming appt in February. Called wife & verbalized understanding.

## 2024-02-02 ENCOUNTER — PATIENT MESSAGE (OUTPATIENT)
Dept: ENDOCRINOLOGY | Facility: CLINIC | Age: 59
End: 2024-02-02
Payer: COMMERCIAL

## 2024-02-02 DIAGNOSIS — E04.1 LEFT THYROID NODULE: Primary | ICD-10-CM

## 2024-02-07 ENCOUNTER — TELEPHONE (OUTPATIENT)
Dept: ENDOCRINOLOGY | Facility: CLINIC | Age: 59
End: 2024-02-07
Payer: COMMERCIAL

## 2024-02-07 NOTE — TELEPHONE ENCOUNTER
----- Message from Louise Cummings, Patient Care Assistant sent at 2/7/2024  9:33 AM CST -----  Regarding: returning call  Contact: pt's wife [place]  Type:  Patient Returning Call    Who Called:  pt's wife [place]    Who Left Message for Patient:  not sure     Does the patient know what this is regarding?:  US appointment     Best Call Back Number:  [phone] (home)     Additional Information:  please call pt's wife [place] to advise. Thanks!

## 2024-02-07 NOTE — TELEPHONE ENCOUNTER
S/w pts wife. States they did not need a message sent over, just needed US scheduled and appt was already made.

## 2024-02-16 ENCOUNTER — OFFICE VISIT (OUTPATIENT)
Dept: ENDOCRINOLOGY | Facility: CLINIC | Age: 59
End: 2024-02-16
Payer: COMMERCIAL

## 2024-02-16 VITALS
WEIGHT: 277.25 LBS | HEART RATE: 51 BPM | OXYGEN SATURATION: 97 % | DIASTOLIC BLOOD PRESSURE: 80 MMHG | BODY MASS INDEX: 37.55 KG/M2 | HEIGHT: 72 IN | SYSTOLIC BLOOD PRESSURE: 122 MMHG

## 2024-02-16 DIAGNOSIS — E04.1 LEFT THYROID NODULE: Primary | ICD-10-CM

## 2024-02-16 DIAGNOSIS — E03.9 HYPOTHYROIDISM, UNSPECIFIED TYPE: ICD-10-CM

## 2024-02-16 PROCEDURE — 99214 OFFICE O/P EST MOD 30 MIN: CPT | Mod: S$GLB,,, | Performed by: INTERNAL MEDICINE

## 2024-02-16 PROCEDURE — 99999 PR PBB SHADOW E&M-EST. PATIENT-LVL III: CPT | Mod: PBBFAC,,, | Performed by: INTERNAL MEDICINE

## 2024-02-16 PROCEDURE — 3008F BODY MASS INDEX DOCD: CPT | Mod: CPTII,S$GLB,, | Performed by: INTERNAL MEDICINE

## 2024-02-16 PROCEDURE — 1160F RVW MEDS BY RX/DR IN RCRD: CPT | Mod: CPTII,S$GLB,, | Performed by: INTERNAL MEDICINE

## 2024-02-16 PROCEDURE — 3074F SYST BP LT 130 MM HG: CPT | Mod: CPTII,S$GLB,, | Performed by: INTERNAL MEDICINE

## 2024-02-16 PROCEDURE — 3079F DIAST BP 80-89 MM HG: CPT | Mod: CPTII,S$GLB,, | Performed by: INTERNAL MEDICINE

## 2024-02-16 PROCEDURE — 1159F MED LIST DOCD IN RCRD: CPT | Mod: CPTII,S$GLB,, | Performed by: INTERNAL MEDICINE

## 2024-02-16 NOTE — PROGRESS NOTES
CHIEF COMPLAINT: Hypothyroidism  58 y.o.  being seen as a f/u. Was seeing Dr. Vazquez. 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 Had a left unsatisfactory FNA 1/22/18. Synthroid 25 mcg (On brand name). Started prior to thyroid surgery. No XRT to head/neck. No Palpitations. No tremors. No diarrhea or constipation. No difficulty swallowing. No change in voice.         PAST MEDICAL HISTORY/PAST SURGICAL HISTORY:  Reviewed in [facility]    SOCIAL HISTORY: + chewing tobacco. No alcohol.    FAMILY HISTORY:  2nd cousin with thyroid cancer. No DM    MEDICATIONS/ALLERGIES: The patient's MedCard has been updated and reviewed.            PE:    GENERAL: Well developed, well nourished.  NECK: Supple, trachea midline, No palpable thyroid nodule.   CHEST: Resp even and unlabored, CTA bilateral.  CARDIAC: RRR, S1, S2 heard, no murmurs, rubs, S3, or S4      US THYROID     CLINICAL HISTORY:  Nontoxic single thyroid nodule     TECHNIQUE:  Ultrasound of the thyroid and cervical lymph nodes was performed.     COMPARISON:  None.     FINDINGS:  The right lobe is surgically absent.  The isthmus measures 4 mm.     The left lobe measures 4.3 x 1.3 x 2.1 cm.  There is a poorly defined lower pole nodule measuring 0.9 x 0.8 x 1 cm.  This is a T rads category 3 would recommend a follow-up study.     Impression:     Single nodule in the lower pole on the left would recommend a follow-up study.        Latest Reference Range & Units 02/12/24 12:42   TSH 0.400 - 4.000 uIU/mL 2.540       ASSESSMENT/PLAN:  1. Thyroid Nodule- Has had a right lobectomy. Had an unsatisfactory FNA on the left 1/2018.n US shows no change in nodule size. No obstructive symptoms. Reviewed Ultrasound images.  At this point can repeat ultrasound in 2 years.    2. Hypothyroidism- TFT WNL. Symptomatically doing well. On brand synthroid.  Has been stable for some time.  Okay to follow up with PCP for hypothyroidism management.  We will need TSH with annual physical.       FOLLOWUP  F/U 2 year TSH, Thyroid US

## 2024-09-22 DIAGNOSIS — E03.9 HYPOTHYROIDISM, UNSPECIFIED TYPE: ICD-10-CM

## 2024-09-23 RX ORDER — LEVOTHYROXINE SODIUM 25 UG/1
TABLET ORAL
Qty: 30 TABLET | Refills: 6 | Status: SHIPPED | OUTPATIENT
Start: 2024-09-23

## 2024-10-15 ENCOUNTER — PATIENT MESSAGE (OUTPATIENT)
Dept: ENDOCRINOLOGY | Facility: CLINIC | Age: 59
End: 2024-10-15
Payer: COMMERCIAL

## 2024-11-05 ENCOUNTER — PATIENT MESSAGE (OUTPATIENT)
Dept: RESEARCH | Facility: HOSPITAL | Age: 59
End: 2024-11-05
Payer: COMMERCIAL

## 2025-03-07 ENCOUNTER — OFFICE VISIT (OUTPATIENT)
Dept: ENDOCRINOLOGY | Facility: CLINIC | Age: 60
End: 2025-03-07
Payer: COMMERCIAL

## 2025-03-07 VITALS
SYSTOLIC BLOOD PRESSURE: 134 MMHG | DIASTOLIC BLOOD PRESSURE: 88 MMHG | OXYGEN SATURATION: 95 % | WEIGHT: 287.25 LBS | HEART RATE: 63 BPM | HEIGHT: 72 IN | BODY MASS INDEX: 38.91 KG/M2

## 2025-03-07 DIAGNOSIS — E04.1 LEFT THYROID NODULE: ICD-10-CM

## 2025-03-07 DIAGNOSIS — E66.01 SEVERE OBESITY (BMI 35.0-39.9) WITH COMORBIDITY: Primary | ICD-10-CM

## 2025-03-07 DIAGNOSIS — E03.9 HYPOTHYROIDISM, UNSPECIFIED TYPE: ICD-10-CM

## 2025-03-07 PROCEDURE — 99999 PR PBB SHADOW E&M-EST. PATIENT-LVL III: CPT | Mod: PBBFAC,,, | Performed by: INTERNAL MEDICINE

## 2025-03-07 NOTE — PROGRESS NOTES
CHIEF COMPLAINT: Hypothyroidism  59 y.o.  being seen as a f/u. Was seeing Dr. Vazquez. 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 Had a left unsatisfactory FNA 1/22/18. Synthroid 25 mcg (On brand name). Started prior to thyroid surgery. No XRT to head/neck. No Palpitations. No tremors. No diarrhea or constipation. No difficulty swallowing. No change in voice. Weight increased. Does try to stay active. Does construction. Over holidays has been eating more.         PAST MEDICAL HISTORY/PAST SURGICAL HISTORY:  Reviewed in [facility]    SOCIAL HISTORY: + chewing tobacco. No alcohol.    FAMILY HISTORY:  2nd cousin with thyroid cancer. No DM    MEDICATIONS/ALLERGIES: The patient's MedCard has been updated and reviewed.            PE:    GENERAL: Well developed, well nourished.  NECK: Supple, trachea midline, No palpable thyroid nodule.   CHEST: Resp even and unlabored, CTA bilateral.  CARDIAC: RRR, S1, S2 heard, no murmurs, rubs, S3, or S4     Latest Reference Range & Units 10/06/24 17:35   TSH 0.400 - 4.000 uIU/mL 1.760   Free T4 0.78 - 2.19 ng/dL 0.74 (L)   (L): Data is abnormally low        ASSESSMENT/PLAN:  1. Thyroid Nodule- Has had a right lobectomy. Had an unsatisfactory FNA on the left 1/2018.  Denies obstructive symptoms.  Check thyroid ultrasound    2. Hypothyroidism- although TSH normal, unclear why free T4 is low.  I would like to repeat TFTs as seen below.  Relatively asymptomatic.    3.  BMI 38.9-he is very active at work.  Discussed diet    FOLLOWUP  Needs TSH, Ft4, Thyroid US

## 2025-03-17 ENCOUNTER — HOSPITAL ENCOUNTER (OUTPATIENT)
Dept: RADIOLOGY | Facility: HOSPITAL | Age: 60
Discharge: HOME | End: 2025-03-17
Attending: INTERNAL MEDICINE
Payer: COMMERCIAL

## 2025-03-17 DIAGNOSIS — E66.01 SEVERE OBESITY (BMI 35.0-39.9) WITH COMORBIDITY: ICD-10-CM

## 2025-03-17 DIAGNOSIS — E03.9 HYPOTHYROIDISM, UNSPECIFIED TYPE: ICD-10-CM

## 2025-03-17 DIAGNOSIS — E04.1 LEFT THYROID NODULE: ICD-10-CM

## 2025-03-17 PROCEDURE — 76536 US EXAM OF HEAD AND NECK: CPT | Mod: 26,,, | Performed by: RADIOLOGY

## 2025-03-17 PROCEDURE — 76536 US EXAM OF HEAD AND NECK: CPT | Mod: TC,PO

## 2025-03-18 ENCOUNTER — PATIENT MESSAGE (OUTPATIENT)
Dept: ENDOCRINOLOGY | Facility: CLINIC | Age: 60
End: 2025-03-18
Payer: COMMERCIAL

## 2025-03-18 ENCOUNTER — RESULTS FOLLOW-UP (OUTPATIENT)
Dept: ENDOCRINOLOGY | Facility: CLINIC | Age: 60
End: 2025-03-18

## 2025-03-19 ENCOUNTER — TELEPHONE (OUTPATIENT)
Dept: ENDOCRINOLOGY | Facility: CLINIC | Age: 60
End: 2025-03-19
Payer: COMMERCIAL

## 2025-03-19 NOTE — TELEPHONE ENCOUNTER
----- Message from Tech Boby sent at 3/18/2025  4:41 PM CDT -----  Type:  Needs Medical Advice Who Called: pt's wife - abeba Symptoms (please be specific): na How long has patient had these symptoms:  na Pharmacy name and phone #:  na Would the patient rather a call back or a response via [facility]? Call back Best Call Back Number: [phone]  Additional Information: Pt's wife calling regarding diagnosis code on his recent visit changed; the claim is being denied by insurance.      Please call Back to advise. Thanks!

## 2025-03-19 NOTE — TELEPHONE ENCOUNTER
I am not sure why that is listed as the primary diagnosis. It was the 3rd thing listed    I re entered it in as hypothyroidism and thyroid nodule.

## 2025-04-21 DIAGNOSIS — E03.9 HYPOTHYROIDISM, UNSPECIFIED TYPE: ICD-10-CM

## 2025-04-21 RX ORDER — LEVOTHYROXINE SODIUM 25 UG/1
25 TABLET ORAL
Qty: 30 TABLET | Refills: 11 | Status: SHIPPED | OUTPATIENT
Start: 2025-04-21